# Patient Record
Sex: FEMALE | Race: BLACK OR AFRICAN AMERICAN | NOT HISPANIC OR LATINO | URBAN - METROPOLITAN AREA
[De-identification: names, ages, dates, MRNs, and addresses within clinical notes are randomized per-mention and may not be internally consistent; named-entity substitution may affect disease eponyms.]

---

## 2022-11-05 ENCOUNTER — EMERGENCY (EMERGENCY)
Facility: HOSPITAL | Age: 57
LOS: 0 days | Discharge: HOME | End: 2022-11-05
Attending: EMERGENCY MEDICINE
Payer: MEDICAID

## 2022-11-05 VITALS
HEART RATE: 72 BPM | RESPIRATION RATE: 18 BRPM | DIASTOLIC BLOOD PRESSURE: 76 MMHG | TEMPERATURE: 100 F | SYSTOLIC BLOOD PRESSURE: 164 MMHG | OXYGEN SATURATION: 100 %

## 2022-11-05 VITALS — WEIGHT: 115.08 LBS | HEIGHT: 65 IN
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
## 2022-11-05 DIAGNOSIS — S46.911A STRAIN OF UNSPECIFIED MUSCLE, FASCIA AND TENDON AT SHOULDER AND UPPER ARM LEVEL, RIGHT ARM, INITIAL ENCOUNTER: ICD-10-CM
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
## 2022-11-05 PROCEDURE — 99284 EMERGENCY DEPT VISIT MOD MDM: CPT

## 2022-11-05 PROCEDURE — 73030 X-RAY EXAM OF SHOULDER: CPT | Mod: 26,RT

## 2022-11-05 PROCEDURE — 96372 THER/PROPH/DIAG INJ SC/IM: CPT

## 2022-11-05 PROCEDURE — 99283 EMERGENCY DEPT VISIT LOW MDM: CPT | Mod: 25

## 2022-11-05 PROCEDURE — 73030 X-RAY EXAM OF SHOULDER: CPT | Mod: RT

## 2022-11-05 RX ORDER — KETOROLAC TROMETHAMINE 30 MG/ML
30 SYRINGE (ML) INJECTION ONCE
Refills: 0 | Status: DISCONTINUED | OUTPATIENT
Start: 2022-11-05 | End: 2022-11-05

## 2022-11-05 RX ORDER — IBUPROFEN 200 MG
1 TABLET ORAL
Qty: 15 | Refills: 0
Start: 2022-11-05 | End: 2022-11-09

## 2022-11-05 RX ORDER — METHOCARBAMOL 500 MG/1
1 TABLET, FILM COATED ORAL
Qty: 15 | Refills: 0
Start: 2022-11-05 | End: 2022-11-09

## 2022-11-05 RX ADMIN — Medication 30 MILLIGRAM(S): at 17:32

## 2022-11-05 NOTE — ED STATDOCS - CARE PROVIDER_API CALL
Isaias Carrion (DO)  Orthopaedic Surgery  [address] [place] [address]  Phone: [phone]  Fax: [phone]  Follow Up Time: Urgent

## 2022-11-05 NOTE — ED STATDOCS - PROGRESS NOTE DETAILS
pt aware of imaging results recommend fu with orthopedic and pmd and to return to ED for any worsening of symptoms. pt well appearing on. -Melodie Park PA-C

## 2022-11-05 NOTE — ED STATDOCS - PATIENT PORTAL LINK FT
You can access the FollowMyHealth Patient Portal offered by [facility] by registering at the following website: [email or web address]. By joining [facility]’s FollowMyHealth portal, you will also be able to view your health information using other applications (apps) compatible with our system.

## 2022-11-05 NOTE — ED STATDOCS - OBJECTIVE STATEMENT
55 y/o female with no pertinent PMHx presents to the ED c/o right shoulder injury with associated severe pain. She was lifting something up from the ground onto a shelf and injured her shoulder. Denies back pain.

## 2022-11-05 NOTE — ED ADULT TRIAGE NOTE - CHIEF COMPLAINT QUOTE
Pt comes to the ED complaining of right shoulder pain. Pt states that it began a few days ago, that she was lifting a heavy bin at work overhead. Pt states that the following day she didn't have any pain but that she was unable to lift her right arm above her head. Pt states that she took 2 advil last night before bed but that the pain was back this am.

## 2022-11-05 NOTE — ED STATDOCS - NS ED ATTENDING STATEMENT MOD
This was a shared visit with the EMMANUEL. I reviewed and verified the documentation and independently performed the documented:

## 2022-11-05 NOTE — ED STATDOCS - PHYSICAL EXAMINATION
Constitutional: NAD, well appearing  HEENT: no rhinorrhea, PERRL, no oropharyngeal erythema or exudates, midline uvula.  TMs clear.  CVS:  RRR, no m/r/g  Resp:  CTAB  GI: soft, ntnd  MSK:    Neuro:  A&Ox3, 5/5 strength to all extremities,  SILT to all extremities  Skin: no rash  psych: clear thought content  Heme/lymph:  No LAD

## 2022-11-05 NOTE — ED STATDOCS - NS ED ROS FT
Constitutional: nad, well appearing  HEENT:  no nasal congestion, eye drainage or ear pain.    CVS:  no cp  Resp:  No sob, no cough  GI:  no abdominal pain, no nausea or vomiting  :  no dysuria  MSK: +Right shoulder pain.  Skin: no rash  Neuro: no change in mental status or level of consciousness  Heme/lymph: no bleeding

## 2022-11-05 NOTE — ED STATDOCS - CLINICAL SUMMARY MEDICAL DECISION MAKING FREE TEXT BOX
Pt with pain to R shoulder with any range of motion.  Mild ttp.  Able to passively range.  NVI distally.  No increased warmth or erythema.  Not suspicious for septic joint.  Will xray, provide meds, and reassess.
